# Patient Record
Sex: MALE | Race: WHITE | Employment: OTHER | URBAN - METROPOLITAN AREA
[De-identification: names, ages, dates, MRNs, and addresses within clinical notes are randomized per-mention and may not be internally consistent; named-entity substitution may affect disease eponyms.]

---

## 2022-07-03 PROBLEM — S72.002A HIP FX, LEFT, CLOSED, INITIAL ENCOUNTER (HCC): Status: ACTIVE | Noted: 2022-07-03

## 2022-07-03 PROBLEM — S72.001A CLOSED RIGHT HIP FRACTURE, INITIAL ENCOUNTER (HCC): Status: ACTIVE | Noted: 2022-07-03

## 2022-07-05 PROBLEM — E43 SEVERE MALNUTRITION (HCC): Status: ACTIVE | Noted: 2022-07-05

## 2022-07-08 PROBLEM — Z71.89 ADVANCE CARE PLANNING: Status: ACTIVE | Noted: 2022-07-08

## 2022-07-08 PROBLEM — F17.200 SMOKING: Status: ACTIVE | Noted: 2022-07-08

## 2022-07-08 PROBLEM — Z79.899 POLYPHARMACY: Status: ACTIVE | Noted: 2022-07-08

## 2022-07-08 PROBLEM — R53.81 DEBILITY: Status: ACTIVE | Noted: 2022-07-08

## 2022-09-12 ENCOUNTER — TELEPHONE (OUTPATIENT)
Dept: ADMINISTRATIVE | Age: 72
End: 2022-09-12

## 2022-09-12 NOTE — TELEPHONE ENCOUNTER
Name of Caller: Xi     Contact phone number: [phone]     Relationship to Patient: 's Office     Provider: n/a     Practice:  [facility]     Chief Complaint/Reason for Call: John Gamboa from 's office states she sent a referral  09/07/22 ( referral has been received) and  still has not received a message and would like to know if patient is able to be seen and if so when is the estimate start of care.  Please advise     Best time of day caller can be reached: any         Patient advised that office/PCP has 24-48 business hours to return their call: yes

## 2022-09-15 NOTE — TELEPHONE ENCOUNTER
Name of Caller: Xi    Contact phone number: [phone]    Relationship to Patient: Dr Ila Coker Rep    Provider: Dr Gosia Hankins    Chief Complaint/Reason for Call: Rob Schmitt called inquiring about the status of patient referral to Palliative Care. Called the office and the office did confirm they did receive the referral however there is a wait list and could be up to a month before patient may be scheduled. Xi was made aware of this    Best time of day caller can be reached: AM       Patient advised that office/PCP has 24-48 business hours to return their call:  Yes

## 2022-09-30 PROBLEM — Z93.1 PEG (PERCUTANEOUS ENDOSCOPIC GASTROSTOMY) STATUS (HCC): Status: ACTIVE | Noted: 2022-09-30
